# Patient Record
Sex: FEMALE | Race: OTHER
[De-identification: names, ages, dates, MRNs, and addresses within clinical notes are randomized per-mention and may not be internally consistent; named-entity substitution may affect disease eponyms.]

---

## 2019-03-22 ENCOUNTER — HOSPITAL ENCOUNTER (EMERGENCY)
Dept: HOSPITAL 72 - EMR | Age: 24
Discharge: HOME | End: 2019-03-22
Payer: MEDICAID

## 2019-03-22 VITALS — BODY MASS INDEX: 23.04 KG/M2 | HEIGHT: 63 IN | WEIGHT: 130 LBS

## 2019-03-22 VITALS — DIASTOLIC BLOOD PRESSURE: 78 MMHG | SYSTOLIC BLOOD PRESSURE: 122 MMHG

## 2019-03-22 DIAGNOSIS — H93.92: Primary | ICD-10-CM
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
## 2019-03-22 DIAGNOSIS — S00.412A: ICD-10-CM

## 2019-03-22 PROCEDURE — 99281 EMR DPT VST MAYX REQ PHY/QHP: CPT

## 2019-03-22 NOTE — EMERGENCY ROOM REPORT
History of Present Illness


General


Chief Complaint:  Foreign Body


Source:  Patient





Present Illness


HPI


23-year-old female presents to the emergency department complaining of foreign 

body sensation in the left ear 3 days.  Patient reports that she was but she 

was using a Q-tip to clean her ear and she believes that some of the cotton may 

have been left inside.  Patient describes only foreign body sensation she 

denies pain she denies ear discharge, bleeding or changes in her hearing.  She 

does report some itching.  No modifying factors.


Allergies:  


Coded Allergies:  


     Meat (Verified  Allergy, Unknown, 3/22/19)





Patient History


Past Medical History:  see triage record


Past Surgical History:  none


Pertinent Family History:  none


Last Menstrual Period:  02/20/2019


Pregnant Now:  No


Immunizations:  UTD


Reviewed Nursing Documentation:  PMH: Agreed; PSxH: Agreed





Nursing Documentation-PMH


Past Medical History:  No Stated History





Review of Systems


All Other Systems:  negative except mentioned in HPI





Physical Exam





Vital Signs








  Date Time  Temp Pulse Resp B/P (MAP) Pulse Ox O2 Delivery O2 Flow Rate FiO2


 


3/22/19 17:52 98.1 81 15 113/77 99 Room Air  








Sp02 EP Interpretation:  reviewed, normal


General Appearance:  no apparent distress, alert, GCS 15, non-toxic


Head:  normocephalic, atraumatic


Eyes:  bilateral eye normal inspection, bilateral eye PERRL


ENT:  hearing grossly normal, normal voice, TMs + canals normal - no visible fb

, slight abrasion on the canal of the left ear, no d/c, normal TM


Neck:  full range of motion


Respiratory:  lungs clear, normal breath sounds, speaking full sentences


Cardiovascular #1:  regular rate, rhythm


Musculoskeletal:  back normal, gait/station normal, normal range of motion, non-

tender


Neurologic:  alert, oriented x3, responsive, motor strength/tone normal, 

sensory intact, speech normal, grossly normal


Psychiatric:  judgement/insight normal


Skin:  normal color, no rash, warm/dry, well hydrated


Lymphatic:  no adenopathy





Medical Decision Making


PA Attestation


Dr. galeana is my supervising Physician whom patient management has been 

discussed with.


Diagnostic Impression:  


 Primary Impression:  


 Normal ear exam


ER Course


23-year-old female presents to the emergency department complaining of foreign 

body sensation in the left ear 3 days.  Patient reports that she was but she 

was using a Q-tip to clean her ear and she believes that some of the cotton may 

have been left inside.  Patient describes only foreign body sensation she 

denies pain she denies ear discharge, bleeding or changes in her hearing.  She 

does report some itching.  No modifying factors.  





Ddx considered but are not limited to OM, OE, mastoiditis, TM perforation, FB





Vital signs: are WNL, pt. is afebrile





H&PE are most consistent with foreign body of the left ear





ORDERS: none required at this time, the diagnosis is clinical





-OTOSCOPY: no obvious Fb in the left ear. no evidence of infection or trauma to 

the external canal.  





ED INTERVENTIONS: none





DISCHARGE: At this time pt. is stable for d/c to home.  Will provide printed 

patient care instructions, and any necessary prescriptions. Care plan and 

follow up instructions have been discussed with the patient prior to discharge. 


D/w followup with ENT specialist for removal.





Last Vital Signs








  Date Time  Temp Pulse Resp B/P (MAP) Pulse Ox O2 Delivery O2 Flow Rate FiO2


 


3/22/19 17:52 98.1 81 15 113/77 99 Room Air  








Disposition:  HOME, SELF-CARE


Condition:  Stable


Patient Instructions:  Medical Screening Exam





Additional Instructions:  


Take any previously prescribed medications as directed. 








- Please note that this Emergency Department Report was dictated using BATS technology software, occasionally this can lead to 

erroneous entry secondary to interpretation by the dictation equipment.











Loreto Brownlee Mar 22, 2019 18:49